# Patient Record
Sex: MALE
[De-identification: names, ages, dates, MRNs, and addresses within clinical notes are randomized per-mention and may not be internally consistent; named-entity substitution may affect disease eponyms.]

---

## 2021-12-04 ENCOUNTER — HOSPITAL ENCOUNTER (EMERGENCY)
Dept: HOSPITAL 97 - ER | Age: 66
Discharge: HOME | End: 2021-12-04
Payer: COMMERCIAL

## 2021-12-04 VITALS — SYSTOLIC BLOOD PRESSURE: 119 MMHG | DIASTOLIC BLOOD PRESSURE: 75 MMHG | OXYGEN SATURATION: 99 %

## 2021-12-04 VITALS — TEMPERATURE: 97.6 F

## 2021-12-04 DIAGNOSIS — F41.9: Primary | ICD-10-CM

## 2021-12-04 LAB
ALBUMIN SERPL BCP-MCNC: 4.2 G/DL (ref 3.4–5)
ALP SERPL-CCNC: 36 U/L (ref 45–117)
ALT SERPL W P-5'-P-CCNC: 44 U/L (ref 12–78)
AST SERPL W P-5'-P-CCNC: 32 U/L (ref 15–37)
BUN BLD-MCNC: 19 MG/DL (ref 7–18)
GLUCOSE SERPLBLD-MCNC: 102 MG/DL (ref 74–106)
HCT VFR BLD CALC: 39.9 % (ref 39.6–49)
INR BLD: 0.98
LYMPHOCYTES # SPEC AUTO: 2 K/UL (ref 0.7–4.9)
MAGNESIUM SERPL-MCNC: 1.8 MG/DL (ref 1.8–2.4)
NT-PROBNP SERPL-MCNC: 97 PG/ML (ref ?–125)
PMV BLD: 8.9 FL (ref 7.6–11.3)
POTASSIUM SERPL-SCNC: 4 MMOL/L (ref 3.5–5.1)
RBC # BLD: 4.63 M/UL (ref 4.33–5.43)
TROPONIN (EMERG DEPT USE ONLY): < 0.02 NG/ML (ref 0–0.04)

## 2021-12-04 PROCEDURE — 81003 URINALYSIS AUTO W/O SCOPE: CPT

## 2021-12-04 PROCEDURE — 85610 PROTHROMBIN TIME: CPT

## 2021-12-04 PROCEDURE — 99284 EMERGENCY DEPT VISIT MOD MDM: CPT

## 2021-12-04 PROCEDURE — 96374 THER/PROPH/DIAG INJ IV PUSH: CPT

## 2021-12-04 PROCEDURE — 84484 ASSAY OF TROPONIN QUANT: CPT

## 2021-12-04 PROCEDURE — 85025 COMPLETE CBC W/AUTO DIFF WBC: CPT

## 2021-12-04 PROCEDURE — 93005 ELECTROCARDIOGRAM TRACING: CPT

## 2021-12-04 PROCEDURE — 83735 ASSAY OF MAGNESIUM: CPT

## 2021-12-04 PROCEDURE — 80076 HEPATIC FUNCTION PANEL: CPT

## 2021-12-04 PROCEDURE — 71045 X-RAY EXAM CHEST 1 VIEW: CPT

## 2021-12-04 PROCEDURE — 80048 BASIC METABOLIC PNL TOTAL CA: CPT

## 2021-12-04 PROCEDURE — 70450 CT HEAD/BRAIN W/O DYE: CPT

## 2021-12-04 PROCEDURE — 83880 ASSAY OF NATRIURETIC PEPTIDE: CPT

## 2021-12-04 PROCEDURE — 36415 COLL VENOUS BLD VENIPUNCTURE: CPT

## 2021-12-04 NOTE — EDPHYS
Physician Documentation                                                                           

 [facility]                                                                 

Name: Rohan Mcneill                                                                              

Age: 66 yrs                                                                                       

Sex: Male                                                                                         

: 1955                                                                                   

MRN: [id number]                                                                                   

Arrival Date: 2021                                                                          

Time: 00:31                                                                                       

Account#: [id number]                                                                            

Bed 8                                                                                             

Private MD: Marcos Roberto ED Physician Mak Marie                                                                      

HPI:                                                                                              

                                                                                             

01:20 This 66 yrs old Male presents to ER via Ambulatory with complaints of Anxiety, light    cp  

      headed, unable to sleep.                                                                    

01:20 Patient reports he was at home tonight when he went to lay down in bed, started         cp  

      becoming anxious, unable to sleep, dizzy and felt pressure across chest. Patient            

      reports he has been dealing with pressure in right ear, intermittent dizziness since        

      2021. Has been seen by ENT.                                                        

                                                                                                  

Historical:                                                                                       

- Allergies:                                                                                      

00:50 No Known Allergies;                                                                     bb  

- Home Meds:                                                                                      

00:50 Vascepa oral [Active]; ezetimibe oral [Active]; esomeprazole magnesium oral [Active];   bb  

      rosuvastatin oral [Active]; losartan-hydrochlorothiazide oral [Active]; aspirin 81 mg       

      Oral tab [Active];                                                                          

                                                                                                  

- Immunization history:: Adult Immunizations up to date, pfizer x 3.                              

- Social history:: Smoking status: Patient denies any tobacco usage or history of.                

                                                                                                  

                                                                                                  

ROS:                                                                                              

01:22 Constitutional: Negative for body aches, chills, fever, poor PO intake.                 cp  

01:22 Eyes: Negative for injury, pain, redness, and discharge.                                cp  

01:22 ENT: Negative for drainage from ear(s), ear pain, sore throat, difficulty swallowing,       

      difficulty handling secretions.                                                             

01:22 Cardiovascular: Positive for chest pressure, Negative for edema.                            

01:22 Respiratory: Negative for cough, shortness of breath, wheezing.                             

01:22 Abdomen/GI: Negative for abdominal pain, nausea, vomiting, and diarrhea.                    

01:22 Neuro: Positive for dizziness, lightheaded, Negative for altered mental status,             

      headache, weakness.                                                                         

01:22 Psych: Positive for anxiety, insomnia.                                                      

01:22 All other systems are negative.                                                             

                                                                                                  

Exam:                                                                                             

                                                                                             

01:25 Constitutional: The patient appears in no acute distress, alert, awake,                 cp  

      non-diaphoretic, non-toxic, well developed, well nourished.                                 

01:25 Head/Face:  Normocephalic, atraumatic.                                                  cp  

01:25 Eyes: Periorbital structures: appear normal, Pupils: equal, round, and reactive to          

      light and accomodation, Extraocular movements: intact throughout, Conjunctiva: normal,      

      no exudate, no injection, Sclera: no appreciated abnormality, Lids and lashes: appear       

      normal, bilaterally.                                                                        

01:25 ENT: External ear(s): are unremarkable, Ear canal(s): are normal, clear, TM's:              

      dullness, bilaterally, Nose: is normal, Mouth: Lips: moist, Oral mucosa: moist,             

      Posterior pharynx: Airway: no evidence of obstruction, patent, swelling, is not             

      appreciated, erythema, is not appreciated.                                                  

01:25 Neck: ROM/movement: is normal, is supple, without pain, no range of motions                 

      limitations, no meningismus.                                                                

01:25 Chest/axilla: Inspection: normal, Palpation: is normal, no crepitus, no tenderness.         

01:25 Cardiovascular: Rate: bradycardic, Rhythm: regular, Edema: is not appreciated, JVD: is      

      not appreciated.                                                                            

01:25 Respiratory: the patient does not display signs of respiratory distress,  Respirations:     

      normal, no use of accessory muscles, no retractions, labored breathing, is not present,     

      Breath sounds: are clear throughout, no decreased breath sounds, no stridor, no             

      wheezing.                                                                                   

01:25 Abdomen/GI: Inspection: abdomen appears normal, Palpation: abdomen is soft and              

      non-tender, in all quadrants.                                                               

01:25 Neuro: Orientation: to person, place \T\ time. Mentation: is normal, Cerebellar function:   

      is grossly normal, Motor: moves all fours, strength is normal, Sensation: is normal.        

                                                                                             

01:24 ECG was reviewed by the Attending Physician.                                            cp  

                                                                                                  

Vital Signs:                                                                                      

00:46  / 75; Pulse 54; Resp 16 S; Temp 97.6(O); Pulse Ox 100% on R/A; Weight 85.73 kg   bb  

      (R); Height 5 ft. 10 in. (177.80 cm) (R); Pain 0/10;                                        

02:13  / 75; Pulse 53; Resp 14 S; Pulse Ox 96% on R/A;                                  as6 

03:22  / 75; Pulse 62; Resp 20 S; Pulse Ox 99% on R/A;                                  as6 

00:46 Body Mass Index 27.12 (85.73 kg, 177.80 cm)                                             bb  

                                                                                                  

MDM:                                                                                              

01:09 Patient medically screened.                                                             [facility] 

03:00 Data reviewed: vital signs, nurses notes, lab test result(s), EKG, radiologic studies,  cp  

      CT scan, plain films.                                                                       

03:00 Differential diagnosis: vertigo, anxiety, cardiac arrythmia. Test interpretation: by ED cp  

      physician or midlevel provider: ECG, plain radiologic studies. Response to treatment:       

      the patient's symptoms have markedly improved after treatment, VSS. Patient resting         

      comfortably in exam room. Will discharge to home for continued monitoring.                  

                                                                                                  

                                                                                             

01:13 Order name: Basic Metabolic Panel; Complete Time: 02:15                                   

                                                                                             

02:15 Interpretation: Normal except: BUN 19; GFR 82.                                            

                                                                                             

01:13 Order name: CBC with Diff; Complete Time: 02:15                                           

                                                                                             

02:15 Interpretation: Normal except: HGB 13.4.                                                  

                                                                                             

01:13 Order name: LFT's; Complete Time: 02:15                                                   

                                                                                             

02:16 Interpretation: Normal except: ALK 36; BILIT 2.0; BILID 0.4.                              

                                                                                             

01:13 Order name: Magnesium; Complete Time: 02:15                                               

                                                                                             

01:13 Order name: NT PRO-BNP; Complete Time: 02:15                                              

                                                                                             

01:13 Order name: PT-INR; Complete Time: 02:15                                                  

                                                                                             

01:13 Order name: CT Head Brain wo Cont                                                         

                                                                                             

01:13 Order name: Troponin (emerg Dept Use Only); Complete Time: 02:15                          

                                                                                             

01:13 Order name: XRAY Chest (1 view)                                                           

                                                                                             

01:13 Order name: EKG; Complete Time: 01:14                                                     

                                                                                             

01:13 Order name: Cardiac monitoring; Complete Time: 01:24                                      

                                                                                             

02:51 Order name: Urine Dipstick-Ancillary                                                    EDMS

                                                                                             

01:13 Order name: EKG - Nurse/Tech; Complete Time: 01:24                                        

                                                                                             

01:13 Order name: IV Saline Lock; Complete Time: 01:24                                          

                                                                                             

01:13 Order name: Labs collected and sent; Complete Time: 01:24                                 

                                                                                             

01:13 Order name: O2 Per Protocol; Complete Time: 01:24                                         

                                                                                             

01:13 Order name: O2 Sat Monitoring; Complete Time: :24                                     cp  

                                                                                             

02:46 Order name: Urine Dipstick-Ancillary (obtain specimen); Complete Time: 03:18            cp  

                                                                                                  

EC: Rate is 54 beats/min. Rhythm is regular. OH interval is normal. QRS interval is normal. cp  

      QT interval is normal. T waves are Inverted in lead aVR. Interpreted by me. Reviewed by     

      me.                                                                                         

                                                                                                  

Administered Medications:                                                                         

:46 Drug: Ativan (LORazepam) 0.5 mg Route: IVP; Site: right antecubital;                     

02:00 Follow up: Response: No adverse reaction                                                as6 

01:46 Drug: Meclizine 25 mg Route: PO;                                                         

02:00 Follow up: Response: No adverse reaction                                                as6 

                                                                                                  

                                                                                                  

Disposition Summary:                                                                              

21 03:04                                                                                    

Discharge Ordered                                                                                 

      Location: Home                                                                          cp  

      Problem: new                                                                            cp  

      Symptoms: have improved                                                                 cp  

      Condition: Stable                                                                       cp  

      Diagnosis                                                                                   

        - Dizziness and giddiness                                                             cp  

        - Anxiety disorder, unspecified                                                       cp  

      Followup:                                                                               cp  

        - With: Private Physician                                                                  

        - When: 2 - 3 days                                                                         

        - Reason: Recheck today's complaints                                                       

      Discharge Instructions:                                                                     

        - Discharge Summary Sheet                                                             cp  

        - Dizziness                                                                           cp  

        - Generalized Anxiety Disorder, Adult                                                 cp  

      Forms:                                                                                      

        - Medication Reconciliation Form                                                      cp  

        - Thank You Letter                                                                    cp  

        - Antibiotic Education                                                                cp  

        - Prescription Opioid Use                                                             cp  

      Prescriptions:                                                                              

        - Vistaril 25 mg Oral capsule                                                              

            - take 1 capsule by ORAL route 4 times per day As needed for anxiety; 30 capsule; cp  

      Refills: 0, Product Selection Permitted                                                     

        - Meclizine 25 mg Oral Tablet                                                              

            - take 1 tablet by ORAL route every 8 hours As needed; 30 tablet; Refills: 0,     cp  

      Product Selection Permitted                                                                 

Addendum:                                                                                         

2021                                                                                        

     07:47 Co-signature as Attending Physician, Mak Marie MD I agree with the assessment and  c
ha

           plan of care.                                                                          

                                                                                                  

Signatures:                                                                                       

Dispatcher [employer]                           Mak Tellez MD MD cha Ballard, Brenda, RN                     RN   Mak Chairez PA PA cp Slawson, Ashby, RN                      RN   as6                                                  

                                                                                                  

**************************************************************************************************

## 2021-12-04 NOTE — XMS REPORT
Continuity of Care Document

                           Created on:2021



Patient:DIPESH WELDON

Sex:Male

:1955

External Reference #:[id number]





Demographics







                          Address                   [address] [place] [address]

 

                          Home Phone                [phone]

 

                          Email Address             [email or web address]

 

                          Preferred Language        Unknown

 

                          Marital Status            Unknown

 

                          Jewish Affiliation     Unknown

 

                          Race                      Unknown

 

                          Additional Race(s)        Unavailable

 

                          Ethnic Group              Unknown









Author







                          Organization              [facility]

t

 

                          Address                   [address] [place] [address] [address]



                                                    [place] [address]

 

                          Phone                     [phone]









Care Team Providers







                    Name                Role                Phone

 

                    MARI          Attending Clinician Unavailable

 

                    RUBY               Attending Clinician Unavailable

 

                    ELAINE                Attending Clinician Unavailable

 

                    ANGELIKA              Attending Clinician Unavailable









Payers







           Payer Name Policy Type Policy Number Effective Date Expiration Date S

ource

 

           OUT OF STATE BCBS -            [id number]                       



           PPO - BCBS                                             

 

           CHOICE/CHOICE            [id number]                        



           PLUS/OPTIONS PPO -                                             



           [facility]                                                    







Problems

This patient has no known problems.



Allergies, Adverse Reactions, Alerts

This patient has no known allergies or adverse reactions.



Medications

This patient has no known medications.



Procedures

This patient has no known procedures.



Encounters







        Start   End     Encounter Admission Attending Care    Care    Encounter 

Source



        Date/Time Date/Time Type    Type    Clinicians Facility Department ID   

   

 

        2021 Outpatient         JIGNESH GUERRA     [facility]     [id number]

[id number] [facility]



        12:12:42 12:25:38                 WHITNEY espino



                                                                        [facility]



                                                                        e

 

        [id number]

[address]



        00:00:00 00:00:00                 NILDA                   [address]

i



                                                                        

 

        20[facility]     [id number]

[address]



        00:00:00 00:00:00                 ASHISH                  [address]

i



                                                                        

 

        2021 [facility]         [facility]     [id number]

[address]



        00:00:00 00:00:00                 EMANI ledesma



                                                                        

 

        2021 [facility]     [id number]

[address]



        00:00:00 00:00:00                 ASHISH                  [id number]

i



                                                                        st

 

        2021 Outpatient                 [facility]     [id number]

[address]



        00:00:00 00:00:00                                         [id number]     [facility]

i



                                                                        st

 

        2020 Outpatient         ELAINE,   [facility]     [id number]

[address]



        00:00:00 00:00:00                 ASHISH                  [address]     [facility]

i



                                                                        st

 

        2020 Outpatient         ELAINE,   [facility]     [id number]

[address]



        00:00:00 00:00:00                 ASHISH                  [id number]

i



                                                                        st







Results

This patient has no known results.

## 2021-12-04 NOTE — ER
Nurse's Notes                                                                                     

 [facility]                                                                 

Name: Rohan Mcneill                                                                              

Age: 66 yrs                                                                                       

Sex: Male                                                                                         

: 1955                                                                                   

MRN: [id number]                                                                                   

Arrival Date: 2021                                                                          

Time: 00:31                                                                                       

Account#: [id number]                                                                            

Bed 8                                                                                             

Private MD: Marcos Roberto                                                                       

Diagnosis: Dizziness and giddiness;Anxiety disorder, unspecified                                  

                                                                                                  

Presentation:                                                                                     

                                                                                             

00:46 Chief complaint: Patient states: he layed down tonight and started feeling tense,       bb  

      pressure on his ear, and he could not go to sleep tonight states he never feels like        

      this. Coronavirus screen: At this time, the client does not indicate any symptoms           

      associated with coronavirus-19. Ebola Screen: No symptoms or risks identified at this       

      time. Initial Sepsis Screen: Does the patient meet any 2 criteria? No. Patient's            

      initial sepsis screen is negative. Does the patient have a suspected source of              

      infection?. Risk Assessment: Do you want to hurt yourself or someone else? Patient          

      reports no desire to harm self or others. Onset of symptoms was 2021 at        

      20:30.                                                                                      

00:46 Method Of Arrival: Ambulatory                                                           bb  

00:46 Acuity: GEMMA 3                                                                           bb  

                                                                                                  

Historical:                                                                                       

- Allergies:                                                                                      

00:50 No Known Allergies;                                                                     bb  

- Home Meds:                                                                                      

00:50 Vascepa oral [Active]; ezetimibe oral [Active]; esomeprazole magnesium oral [Active];   bb  

      rosuvastatin oral [Active]; losartan-hydrochlorothiazide oral [Active]; aspirin 81 mg       

      Oral tab [Active];                                                                          

                                                                                                  

- Immunization history:: Adult Immunizations up to date, pfizer x 3.                              

- Social history:: Smoking status: Patient denies any tobacco usage or history of.                

                                                                                                  

                                                                                                  

Screenin:30 Abuse screen: Denies threats or abuse. Nutritional screening: No deficits noted.        as6 

      Tuberculosis screening: No symptoms or risk factors identified. Fall Risk None              

      identified.                                                                                 

                                                                                                  

Assessment:                                                                                       

01:27 General: Appears in no apparent distress. comfortable, Behavior is calm, cooperative,   as6 

      anxious. Pain: Complains of pain in chest Quality of pain is described as pressure.         

      Neuro: Level of Consciousness is awake, alert, obeys commands, Oriented to person,          

      place, time, situation, Reports weakness. Cardiovascular: Capillary refill < 3 seconds      

      Patient's skin is warm and dry. Chest pain quality is pressure. Respiratory: Airway is      

      patent Trachea midline Respiratory effort is even, unlabored, Respiratory pattern is        

      regular, symmetrical. Derm: Skin is intact, is healthy with good turgor, Skin is dry,       

      Skin is pink, warm \T\ dry. Skin temperature is warm. Musculoskeletal: Reports weakness     

      in generalized.                                                                             

                                                                                                  

Vital Signs:                                                                                      

00:46  / 75; Pulse 54; Resp 16 S; Temp 97.6(O); Pulse Ox 100% on R/A; Weight 85.73 kg   bb  

      (R); Height 5 ft. 10 in. (177.80 cm) (R); Pain 0/10;                                        

02:13  / 75; Pulse 53; Resp 14 S; Pulse Ox 96% on R/A;                                  as6 

03:22  / 75; Pulse 62; Resp 20 S; Pulse Ox 99% on R/A;                                  as6 

00:46 Body Mass Index 27.12 (85.73 kg, 177.80 cm)                                             bb  

                                                                                                  

ED Course:                                                                                        

00:31 Patient arrived in ED.                                                                  es  

00:31 Marcos Roberto MD is Private Physician.                                               es  

00:50 Triage completed.                                                                       bb  

00:50 Arm band placed on Patient placed in an exam room.                                      bb  

00:57 Mak Chong PA is PHCP.                                                                cp  

00:57 Mak Marie MD is Attending Physician.                                             cp  

01:01 Deepak Vang RN is Primary Nurse.                                                    as6 

01:27 Inserted saline lock: 18 gauge in right antecubital area, using aseptic technique.      as6 

      Blood collected.                                                                            

01:30 Placed in gown. Bed in low position. Call light in reach. Side rails up X2. Adult w/    as6 

      patient. Cardiac monitor on. Pulse ox on. NIBP on. Warm blanket given.                      

01:46 XRAY Chest (1 view) In Process Unspecified.                                             EDMS

01:55 CT Head Brain wo Cont In Process Unspecified.                                           EDMS

03:22 No provider procedures requiring assistance completed. IV discontinued, intact,         as6 

      bleeding controlled, No redness/swelling at site. Pressure dressing applied.                

                                                                                                  

Administered Medications:                                                                         

01:46 Drug: Ativan (LORazepam) 0.5 mg Route: IVP; Site: right antecubital;                    as6 

02:00 Follow up: Response: No adverse reaction                                                as6 

01:46 Drug: Meclizine 25 mg Route: PO;                                                        as6 

02:00 Follow up: Response: No adverse reaction                                                as6 

                                                                                                  

                                                                                                  

Outcome:                                                                                          

03:04 Discharge ordered by MD. benavides  

03:23 Discharged to home ambulatory, with significant other.                                  as6 

03:23 Condition: stable                                                                           

03:23 Discharge instructions given to patient, significant other, Instructed on discharge         

      instructions, follow up and referral plans. medication usage, Demonstrated                  

      understanding of instructions, follow-up care, medications, Prescriptions given X 2.        

03:24 Patient left the ED.                                                                    as6 

                                                                                                  

Signatures:                                                                                       

Dispatcher [employer]                           Lyudmila Ramirez Brenda, RN                     RN   Mak Chairez PA PA cp Slawson, Ashby, RN                      RN   as6                                                  

                                                                                                  

**************************************************************************************************

## 2021-12-04 NOTE — RAD REPORT
EXAM DESCRIPTION:  Alexy Single View12/4/2021 1:46 am

 

CLINICAL HISTORY:  Chest pain

 

COMPARISON:  none

 

FINDINGS:   The lungs appear clear of acute infiltrate. The heart is normal size

 

IMPRESSION:   No acute abnormalities displayed

## 2021-12-04 NOTE — EKG
Test Date:    2021-12-04               Test Time:    01:15:39

Technician:   AS                                     

                                                     

MEASUREMENT RESULTS:                                       

Intervals:                                           

Rate:         54                                     

KY:           154                                    

QRSD:         100                                    

QT:           452                                    

QTc:          428                                    

Axis:                                                

P:            14                                     

KY:           154                                    

QRS:          14                                     

T:            34                                     

                                                     

INTERPRETIVE STATEMENTS:                                       

                                                     

Sinus bradycardia

Inferior infarct, age undetermined

Abnormal ECG

No previous ECG available for comparison



Electronically Signed On 12-04-21 12:35:31 CST by Delano Chaudhary

## 2021-12-05 NOTE — RAD REPORT
EXAM DESCRIPTION:  CT - Head Brain Wo Cont - 12/4/2021 6:29 am

 

CLINICAL HISTORY:  Dizziness

 

COMPARISON:  None.

 

TECHNIQUE:  Head/brain axial images acquired without contrast. Coronal and sagittal reformats created
. Exam performed according to departmental dose-optimization program which includes automated exposur
e control, adjustment of mA and/or kV according to patient size, and/or use of iterative reconstructi
on technique.

 

FINDINGS:  No midline shift, mass effect, intracranial hemorrhage, or hydrocephalus.

Empty Sella (likely normal variant).

Prominent cisterna magna (normal variant).

Paranasal sinuses clear. Mastoid air cells clear.

No skull fracture or significant skull lesion.

 

IMPRESSION:  No CT evidence of acute intracranial abnormality.

 

Electronically signed by:   Jose Segundo MD   12/4/2021 2:33 AM CST Workstation: [id number]

 

 

Due to temporary technical issues with the PACS/Fluency reporting system, reports are being signed by
 the in house radiologists without review as a courtesy to insure prompt reporting. The interpreting 
radiologist is fully responsible for the content of the report.